# Patient Record
(demographics unavailable — no encounter records)

---

## 2024-12-23 NOTE — HISTORY OF PRESENT ILLNESS
[FreeTextEntry1] : 63 yo F with no significant PMHx presenting with bilateral LE discomfort, heaviness, swelling, fatigue, and large varicose veins while sitting and standing.  Denies history of DVT or SVT or trauma. Pt has worn compression stockings for past 3 months and elevated legs without improvement. Pt states she is unable to work without pain and is limited in performing ADLs. She has been using an elliptical machine while sitting at her work desk, without improvement in symptoms. She must unzip her boots at the end of the day due to discomfort around her ankles. Denies recent weight gain. No history of hypercoagulable disorder. Denies claudication or rest pain or ulcers. Pt exercises regularly with moderate walking. Denies chest pain, SOB, dyspnea on exertion, or orthopnea. [de-identified] : s/p RLE GSV RFA and chemical foam ablation of GSV below the knee with varithena Now s/p RLE sclerotherapy for symptomatic reticular veins and telangiectasias.  She returns for evaluation of numbness and tingling around the Varithena injection site near the right ankle after undergoing right knee replacement 2 weeks ago.  She is concerned there is a new symptomatic vein.  Her right leg remains swollen postoperatively, but she is able to drive and ambulate with a cane.

## 2024-12-23 NOTE — PHYSICAL EXAM
[Normal Rate and Rhythm] : normal rate and rhythm [2+] : left 2+ [Ankle Swelling (On Exam)] : present [Ankle Swelling On The Right] : of the right ankle [Ankle Swelling Bilaterally] : severe [Varicose Veins Of Lower Extremities] : not present [] : not present [Abdomen Tenderness] : ~T ~M No abdominal tenderness [Alert] : alert [Oriented to Person] : oriented to person [Oriented to Place] : oriented to place [Oriented to Time] : oriented to time [Calm] : calm [de-identified] : NAD [de-identified] : supple, no masses [de-identified] : unlabored breathing [FreeTextEntry1] : R knee incision c/d/i without erythema No calf tenderness [de-identified] : FROM of all 4 extremities

## 2024-12-23 NOTE — ASSESSMENT
[FreeTextEntry1] : 61 yo F with no significant PMHx presenting with bilateral LE discomfort, heaviness, swelling, fatigue, and large varicose veins while sitting and standing s/p RLE GSV RFA and chemical foam ablation of GSV below the knee with varithena with some persistent pain in the branches from GSV below the knee. s/p RLE GSV RFA and chemical foam ablation of GSV below the knee with varithena Now s/p RLE USG sclerotherapy for symptomatic varicose vein branches below the knee  She returns for evaluation of numbness and tingling around the Varithena injection site near the right ankle after undergoing right knee replacement 2 weeks ago.  She is concerned there is a new symptomatic vein.  Her right leg remains swollen postoperatively, but she is able to drive and ambulate with a cane.  Plan Ultrasound was used to inspect the area of concern.  Noncompressible vein branch and partially thrombosed vein branches appreciated in the location with associated surrounding inflammatory changes. No evidence of DVT. The patient was reassured there are no new veins to treat in the area and she should wait until RLE edema has completely resolved prior to assessing for superficial venous concerns. Follow up PRN    [Arterial/Venous Disease] : arterial/venous disease [Foot care/Footwear] : foot care/footwear